# Patient Record
Sex: MALE | Race: WHITE | NOT HISPANIC OR LATINO | Employment: FULL TIME | URBAN - METROPOLITAN AREA
[De-identification: names, ages, dates, MRNs, and addresses within clinical notes are randomized per-mention and may not be internally consistent; named-entity substitution may affect disease eponyms.]

---

## 2017-10-17 PROBLEM — I10 HTN (HYPERTENSION), BENIGN: Status: ACTIVE | Noted: 2017-10-17

## 2017-10-17 PROBLEM — Z82.49 FAMILY HISTORY OF EARLY CAD: Status: ACTIVE | Noted: 2017-10-17

## 2017-10-17 PROBLEM — E66.812 CLASS 2 OBESITY DUE TO EXCESS CALORIES WITH BODY MASS INDEX (BMI) OF 35.0 TO 35.9 IN ADULT: Status: ACTIVE | Noted: 2017-10-17

## 2017-10-17 PROBLEM — E66.09 CLASS 2 OBESITY DUE TO EXCESS CALORIES WITH BODY MASS INDEX (BMI) OF 35.0 TO 35.9 IN ADULT: Status: ACTIVE | Noted: 2017-10-17

## 2017-10-17 NOTE — PROGRESS NOTES
Subjective:   Patient ID:  Mann Hernandez is a 56 y.o. male who presents for eval of CVD    HPI:  The patient is here for CAD risk factors/HTN.  The patient has no chest pain, SOB, TIA, palpitations, syncope or pre-syncope.Patient does not exercise a lot.BP typically 130/80-85.Several family with early MI or death.        Review of Systems   Constitution: Negative for chills, decreased appetite, diaphoresis, fever, weakness, malaise/fatigue, night sweats, weight gain and weight loss.   HENT: Negative for congestion, hoarse voice, nosebleeds, sore throat and tinnitus.    Eyes: Negative for blurred vision, double vision, vision loss in left eye, vision loss in right eye, visual disturbance and visual halos.   Cardiovascular: Negative for chest pain, claudication, cyanosis, dyspnea on exertion, irregular heartbeat, leg swelling, near-syncope, orthopnea, palpitations, paroxysmal nocturnal dyspnea and syncope.   Respiratory: Negative for cough, hemoptysis, shortness of breath, sleep disturbances due to breathing, snoring, sputum production and wheezing.    Endocrine: Negative for cold intolerance, heat intolerance, polydipsia, polyphagia and polyuria.   Hematologic/Lymphatic: Negative for adenopathy and bleeding problem. Does not bruise/bleed easily.   Skin: Negative for color change, dry skin, flushing, itching, nail changes, poor wound healing, rash, skin cancer, suspicious lesions and unusual hair distribution.   Musculoskeletal: Negative for arthritis, back pain, falls, gout, joint pain, joint swelling, muscle cramps, muscle weakness, myalgias and stiffness.   Gastrointestinal: Negative for abdominal pain, anorexia, change in bowel habit, constipation, diarrhea, dysphagia, heartburn, hematemesis, hematochezia, melena and vomiting.   Genitourinary: Negative for decreased libido, dysuria, hematuria, hesitancy and urgency.   Neurological: Negative for excessive daytime sleepiness, dizziness, focal weakness, headaches,  "light-headedness, loss of balance, numbness, paresthesias, seizures, sensory change, tremors and vertigo.   Psychiatric/Behavioral: Negative for altered mental status, depression, hallucinations, memory loss, substance abuse and suicidal ideas. The patient does not have insomnia and is not nervous/anxious.    Allergic/Immunologic: Negative for environmental allergies and hives.   ,,HDL 55, LDL 87    Objective: /86   Pulse 70   Ht 6' 1" (1.854 m)   Wt 127.3 kg (280 lb 10.3 oz)   BMI 37.03 kg/m²      Physical Exam   Constitutional: He is oriented to person, place, and time. He appears well-developed and well-nourished. No distress.   HENT:   Head: Normocephalic.   Eyes: EOM are normal. Pupils are equal, round, and reactive to light.   Neck: Normal range of motion. No thyromegaly present.   Cardiovascular: Normal rate, regular rhythm, normal heart sounds and intact distal pulses.  Exam reveals no gallop and no friction rub.    No murmur heard.  Pulses:       Carotid pulses are 3+ on the right side, and 3+ on the left side.       Radial pulses are 3+ on the right side, and 3+ on the left side.        Femoral pulses are 3+ on the right side, and 3+ on the left side.       Popliteal pulses are 3+ on the right side, and 3+ on the left side.        Dorsalis pedis pulses are 3+ on the right side, and 3+ on the left side.        Posterior tibial pulses are 3+ on the right side, and 3+ on the left side.   Pulmonary/Chest: Effort normal and breath sounds normal. No respiratory distress. He has no wheezes. He has no rales. He exhibits no tenderness.   Abdominal: Soft. He exhibits no distension and no mass. There is no tenderness.   Musculoskeletal: Normal range of motion.   Lymphadenopathy:     He has no cervical adenopathy.   Neurological: He is alert and oriented to person, place, and time.   Skin: Skin is warm. He is not diaphoretic. No cyanosis. Nails show no clubbing.   Psychiatric: He has a normal " mood and affect. His speech is normal and behavior is normal. Judgment and thought content normal. Cognition and memory are normal.       Assessment:     1. HTN (hypertension), benign    2. Family history of early CAD    3. Class 2 obesity due to excess calories with serious comorbidity and body mass index (BMI) of 35.0 to 35.9 in adult        Plan:   Discussed diet , achieving and maintaining ideal body weight, and exercise.   We reviewed meds in detail.  Reassured-discussed goals, options, plan.  Ramipril 5 mg at nite; discussed omega-3 -more important if CAC high;if CAC high, will get stress; discussed wt loss and exercise    Mann was seen today for family history of cad.    Diagnoses and all orders for this visit:    HTN (hypertension), benign  -     IN OFFICE EKG 12-LEAD (to Muse); Future; Expected date: 10/19/2017  -     Basic metabolic panel; Future; Expected date: 11/15/2017  -     CT Cardiac Scoring; Future; Expected date: 10/19/2017  -     CAR CT Cardiac Scoring; Future; Expected date: 10/19/2017  -     ramipril (ALTACE) 5 MG capsule; Take 1 capsule (5 mg total) by mouth once daily.    Family history of early CAD  -     IN OFFICE EKG 12-LEAD (to Muse); Future; Expected date: 10/19/2017  -     CT Cardiac Scoring; Future; Expected date: 10/19/2017  -     CAR CT Cardiac Scoring; Future; Expected date: 10/19/2017  -     ramipril (ALTACE) 5 MG capsule; Take 1 capsule (5 mg total) by mouth once daily.    Class 2 obesity due to excess calories with serious comorbidity and body mass index (BMI) of 35.0 to 35.9 in adult  -     IN OFFICE EKG 12-LEAD (to Muse); Future; Expected date: 10/19/2017  -     CT Cardiac Scoring; Future; Expected date: 10/19/2017  -     CAR CT Cardiac Scoring; Future; Expected date: 10/19/2017  -     ramipril (ALTACE) 5 MG capsule; Take 1 capsule (5 mg total) by mouth once daily.    Other orders  -     FLUARIX QUAD 5662-0256, PF, 60 mcg (15 mcg x 4)/0.5 mL vaccine; TO BE ADMINISTERED BY  PHARMACIST FOR IMMUNIZATION  -     amlodipine (NORVASC) 10 MG tablet; Take 10 mg by mouth once daily.  -     spironolactone (ALDACTONE) 25 MG tablet; Take 25 mg by mouth once daily.  -     loratadine (CLARITIN) 10 mg tablet; Take 10 mg by mouth daily as needed for Allergies.  -     aspirin (ECOTRIN) 81 MG EC tablet; Take 81 mg by mouth once daily.  -     fluticasone (FLONASE) 50 mcg/actuation nasal spray; 1 spray by Each Nare route once daily.            Return get CAC soon; chem 7 in 1 months and find out home BPs.FU prn

## 2017-10-18 ENCOUNTER — OFFICE VISIT (OUTPATIENT)
Dept: CARDIOLOGY | Facility: CLINIC | Age: 57
End: 2017-10-18
Payer: COMMERCIAL

## 2017-10-18 VITALS
HEIGHT: 73 IN | SYSTOLIC BLOOD PRESSURE: 134 MMHG | HEART RATE: 70 BPM | BODY MASS INDEX: 37.19 KG/M2 | DIASTOLIC BLOOD PRESSURE: 86 MMHG | WEIGHT: 280.63 LBS

## 2017-10-18 DIAGNOSIS — R69 DIAGNOSIS DEFERRED: ICD-10-CM

## 2017-10-18 DIAGNOSIS — I10 HTN (HYPERTENSION), BENIGN: ICD-10-CM

## 2017-10-18 DIAGNOSIS — Z82.49 FAMILY HISTORY OF EARLY CAD: ICD-10-CM

## 2017-10-18 PROCEDURE — 99204 OFFICE O/P NEW MOD 45 MIN: CPT | Mod: S$GLB,,, | Performed by: INTERNAL MEDICINE

## 2017-10-18 PROCEDURE — 99999 PR PBB SHADOW E&M-NEW PATIENT-LVL III: CPT | Mod: PBBFAC,,, | Performed by: INTERNAL MEDICINE

## 2017-10-18 PROCEDURE — 93000 ELECTROCARDIOGRAM COMPLETE: CPT | Mod: S$GLB,,, | Performed by: INTERNAL MEDICINE

## 2017-10-18 RX ORDER — ASPIRIN 81 MG/1
81 TABLET ORAL DAILY
COMMUNITY
End: 2019-04-16

## 2017-10-18 RX ORDER — FLUTICASONE PROPIONATE 50 MCG
1 SPRAY, SUSPENSION (ML) NASAL DAILY
COMMUNITY

## 2017-10-18 RX ORDER — RAMIPRIL 5 MG/1
5 CAPSULE ORAL DAILY
Qty: 90 CAPSULE | Refills: 3 | Status: SHIPPED | OUTPATIENT
Start: 2017-10-18 | End: 2019-04-16

## 2017-10-18 RX ORDER — AMLODIPINE BESYLATE 10 MG/1
10 TABLET ORAL DAILY
COMMUNITY

## 2017-10-18 RX ORDER — SPIRONOLACTONE 25 MG/1
25 TABLET ORAL DAILY
COMMUNITY

## 2017-10-18 RX ORDER — LORATADINE 10 MG/1
10 TABLET ORAL DAILY PRN
COMMUNITY

## 2017-10-18 NOTE — PATIENT INSTRUCTIONS
Discussed diet , achieving and maintaining ideal body weight, and exercise.   We reviewed meds in detail.  Reassured-discussed goals, options, plan.  Ramipril 5 mg at nite; discussed omega-3 -more important if CAC high;if CAC high, will get stress; discussed wt loss and exercise

## 2017-11-01 ENCOUNTER — HOSPITAL ENCOUNTER (OUTPATIENT)
Dept: RADIOLOGY | Facility: HOSPITAL | Age: 57
Discharge: HOME | End: 2017-11-01
Attending: INTERNAL MEDICINE
Payer: COMMERCIAL

## 2017-11-01 ENCOUNTER — TELEPHONE (OUTPATIENT)
Dept: CARDIOLOGY | Facility: CLINIC | Age: 57
End: 2017-11-01

## 2017-11-01 DIAGNOSIS — Z82.49 FAMILY HISTORY OF EARLY CAD: ICD-10-CM

## 2017-11-01 DIAGNOSIS — I10 HTN (HYPERTENSION), BENIGN: ICD-10-CM

## 2017-11-01 PROCEDURE — 75571 CT HRT W/O DYE W/CA TEST: CPT | Mod: TC

## 2017-11-01 PROCEDURE — 75571 CT HRT W/O DYE W/CA TEST: CPT | Mod: 26,,, | Performed by: RADIOLOGY

## 2017-11-01 NOTE — TELEPHONE ENCOUNTER
Results of calcium score were given to patient.    Notes Recorded by Jerzy Espinosa MD on 11/1/2017 at 3:22 PM CDT  Release-score only 5 so in the best 25% for his age and gender.

## 2017-12-05 ENCOUNTER — PATIENT MESSAGE (OUTPATIENT)
Dept: CARDIOLOGY | Facility: CLINIC | Age: 57
End: 2017-12-05

## 2019-04-16 ENCOUNTER — OFFICE VISIT (OUTPATIENT)
Dept: FAMILY MEDICINE | Facility: CLINIC | Age: 59
End: 2019-04-16
Payer: COMMERCIAL

## 2019-04-16 VITALS
SYSTOLIC BLOOD PRESSURE: 124 MMHG | WEIGHT: 286.63 LBS | DIASTOLIC BLOOD PRESSURE: 78 MMHG | HEART RATE: 80 BPM | BODY MASS INDEX: 38.82 KG/M2 | RESPIRATION RATE: 18 BRPM | TEMPERATURE: 98 F | OXYGEN SATURATION: 97 % | HEIGHT: 72 IN

## 2019-04-16 DIAGNOSIS — Z83.3 FAMILY HISTORY OF DIABETES MELLITUS: ICD-10-CM

## 2019-04-16 DIAGNOSIS — Z85.46 HISTORY OF PROSTATE CANCER: ICD-10-CM

## 2019-04-16 DIAGNOSIS — J30.2 SEASONAL ALLERGIC RHINITIS, UNSPECIFIED TRIGGER: ICD-10-CM

## 2019-04-16 DIAGNOSIS — L30.9 ECZEMA, UNSPECIFIED TYPE: ICD-10-CM

## 2019-04-16 DIAGNOSIS — Z00.00 ROUTINE MEDICAL EXAM: Primary | ICD-10-CM

## 2019-04-16 DIAGNOSIS — I10 ESSENTIAL HYPERTENSION: ICD-10-CM

## 2019-04-16 DIAGNOSIS — Z86.010 HISTORY OF COLONIC POLYPS: ICD-10-CM

## 2019-04-16 DIAGNOSIS — H61.23 BILATERAL IMPACTED CERUMEN: ICD-10-CM

## 2019-04-16 DIAGNOSIS — K21.9 GASTROESOPHAGEAL REFLUX DISEASE, ESOPHAGITIS PRESENCE NOT SPECIFIED: ICD-10-CM

## 2019-04-16 DIAGNOSIS — Z82.49 FAMILY HISTORY OF EARLY CAD: ICD-10-CM

## 2019-04-16 DIAGNOSIS — E79.0 HYPERURICEMIA: ICD-10-CM

## 2019-04-16 PROBLEM — Z86.0100 HISTORY OF COLONIC POLYPS: Status: ACTIVE | Noted: 2019-04-16

## 2019-04-16 PROCEDURE — 3078F PR MOST RECENT DIASTOLIC BLOOD PRESSURE < 80 MM HG: ICD-10-PCS | Mod: CPTII,S$GLB,, | Performed by: INTERNAL MEDICINE

## 2019-04-16 PROCEDURE — 99999 PR PBB SHADOW E&M-EST. PATIENT-LVL III: CPT | Mod: PBBFAC,,, | Performed by: INTERNAL MEDICINE

## 2019-04-16 PROCEDURE — 3078F DIAST BP <80 MM HG: CPT | Mod: CPTII,S$GLB,, | Performed by: INTERNAL MEDICINE

## 2019-04-16 PROCEDURE — 99386 PR PREVENTIVE VISIT,NEW,40-64: ICD-10-PCS | Mod: 25,S$GLB,, | Performed by: INTERNAL MEDICINE

## 2019-04-16 PROCEDURE — 3074F PR MOST RECENT SYSTOLIC BLOOD PRESSURE < 130 MM HG: ICD-10-PCS | Mod: CPTII,S$GLB,, | Performed by: INTERNAL MEDICINE

## 2019-04-16 PROCEDURE — 69210 REMOVE IMPACTED EAR WAX UNI: CPT | Mod: S$GLB,,, | Performed by: INTERNAL MEDICINE

## 2019-04-16 PROCEDURE — 99999 PR PBB SHADOW E&M-EST. PATIENT-LVL III: ICD-10-PCS | Mod: PBBFAC,,, | Performed by: INTERNAL MEDICINE

## 2019-04-16 PROCEDURE — 3074F SYST BP LT 130 MM HG: CPT | Mod: CPTII,S$GLB,, | Performed by: INTERNAL MEDICINE

## 2019-04-16 PROCEDURE — 99386 PREV VISIT NEW AGE 40-64: CPT | Mod: 25,S$GLB,, | Performed by: INTERNAL MEDICINE

## 2019-04-16 PROCEDURE — 69210 PR REMOVAL IMPACTED CERUMEN REQUIRING INSTRUMENTATION, UNILATERAL: ICD-10-PCS | Mod: S$GLB,,, | Performed by: INTERNAL MEDICINE

## 2019-04-16 RX ORDER — GUAIFENESIN 200 MG/1
400 TABLET ORAL EVERY 4 HOURS PRN
COMMUNITY

## 2019-04-16 RX ORDER — MINERAL OIL
180 ENEMA (ML) RECTAL DAILY
COMMUNITY

## 2019-04-16 RX ORDER — TRIAMCINOLONE ACETONIDE 1 MG/G
CREAM TOPICAL 2 TIMES DAILY
Qty: 45 G | Refills: 12 | Status: SHIPPED | OUTPATIENT
Start: 2019-04-16

## 2019-04-16 NOTE — PROGRESS NOTES
Chief complaint:  New patient physical    58-year-old white male new to primary care at [facility].  His prior PCP at [facility] retired.  He would follow up with Dr. Wahl in ENT about twice here to get wax removed.  He does feel is wax in the ears at this time and has been over a year since he had removal.  He does get a rash on his right ankle and calf and was told it was eczema and likely had some topical cortisone of some form and would like some prescribed.  He had labs about 1 month ago was told all was normal and he will get me a copy of those.  He has had a chronic cough which is better with Claritin and when allergies are worse he takes Allegra.  He is on reflux medications but no heartburn.  He had 1 episode of classic gout in 1 of his toes was on HCTZ at the time and has been off with no recurrence.  He did have some mild hyperuricemia.  He has a history of prostate cancer treated with radiation at [facility].  He has history of colon polyps and pretty sure it was 2017 was told to follow up in 3 years.      ROS:   CONST: weight stable. EYES: no vision change. ENT: no sore throat. CV: no chest pain w/ exertion. RESP: no shortness of breath. GI: no nausea, vomiting, diarrhea. No dysphagia. : no urinary issues. MUSCULOSKELETAL: no new myalgias or arthralgias. SKIN: no new changes. NEURO: no focal deficits. PSYCH: no new issues. ENDOCRINE: no polyuria. HEME: no lymph nodes. ALLERGY: no general pruritis.    Past Medical History:   Diagnosis Date    Eczema 4/16/2019    Elevated PSA/prostate cancer     Essential hypertension 4/16/2019    Family history of diabetes mellitus 4/16/2019    Family history of early CAD 10/17/2017    Gastroesophageal reflux disease, history of EGD at least x1 in the past 4/16/2019    History of colonic polyps- 2017 at [facility] -3 yrs 4/16/2019    History of prostate cancer- seeds at  4/16/2019    Hypertension     Hyperuricemia, acute gout only x1 in the toe 4/16/2019     Seasonal allergic rhinitis, uses Claritin and then Allegra when needed 2019    Ulcer      Past surgical history:  1.  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   for 30 years with no previous marriage and has no children.  He has never smoked.  He does drink alcohol daily a few beers or wine.    Family history:  Father  at 75 of a heart attack with on stated form of cancer.  Father had heart trouble and hypertension.  Mother with history of hypertension and diabetes.  One brother age 60 in good health.  No sisters.          Gen: no distress  EYES: conjunctiva clear, non-icteric, PERRL  ENT: nose clear, nasal mucosa normal, oropharynx clear and moist, teeth good, ear canals impacted with dark wax, after wax removed canals are normal and tympanic membranes normal  NECK:supple, thyroid non-palpable  RESP: effort is good, lungs clear  CV: heart RRR w/o murmur, gallops or rubs; no carotid bruits, no edema  GI: abdomen soft, non-distended, non-tender, no hepatosplenomegaly  MS: gait normal, no clubbing or cyanosis of the digits  SKIN: no rashes, warm to touch      Procedure note:  After verbal informed consent obtained physician used an ear pick to remove large chunks of dark ear wax from both ear canals without complications or patient discomfort.        Diagnoses and all orders for this visit:    Routine medical exam, new to the clinic, appears to be up-to-date on PSA and colonoscopy.  Colonoscopy may be due next year.  He will get a copy of his prior labs for review and make sure he is up-to-date on his PSA and so forth.  He obviously knows he needs to work on weight loss.    Essential hypertension, chronic and stable    Seasonal allergic rhinitis, unspecified trigger, chronic and stable at this point    Family history of early CAD    Hyperuricemia, follow for any gout exacerbations    Eczema, unspecified type, prescribe triamcinolone    History of  "colonic polyps, up-to-date    Gastroesophageal reflux disease, esophagitis presence not specified, continue PPI and discussed eventual checking a vitamin-D, magnesium in B12    History of prostate cancer    Family history of diabetes mellitus, assess for metabolic syndrome but he has not been told about any signs of metabolic syndrome in the past on his labs, I will review    Bilateral impacted cerumen, wax removed by physician    Other orders  -     triamcinolone acetonide 0.1% (KENALOG) 0.1 % cream; Apply topically 2 (two) times daily.                            Clinical no be sensitive since he is new to the clinic and I do not want any discrepancies in the available history to cause any problems for follow-up"This note will not be shared with the patient."       "

## 2019-04-17 ENCOUNTER — PATIENT MESSAGE (OUTPATIENT)
Dept: FAMILY MEDICINE | Facility: CLINIC | Age: 59
End: 2019-04-17

## 2019-04-17 DIAGNOSIS — Z12.11 COLON CANCER SCREENING: ICD-10-CM

## 2020-02-14 DIAGNOSIS — I10 ESSENTIAL HYPERTENSION: ICD-10-CM

## 2020-05-12 ENCOUNTER — PATIENT MESSAGE (OUTPATIENT)
Dept: ADMINISTRATIVE | Facility: HOSPITAL | Age: 60
End: 2020-05-12

## 2020-06-19 DIAGNOSIS — Z11.59 NEED FOR HEPATITIS C SCREENING TEST: ICD-10-CM

## 2021-03-02 ENCOUNTER — OFFICE VISIT (OUTPATIENT)
Dept: OTOLARYNGOLOGY | Facility: CLINIC | Age: 61
End: 2021-03-02
Payer: COMMERCIAL

## 2021-03-02 VITALS
TEMPERATURE: 98 F | BODY MASS INDEX: 38.57 KG/M2 | DIASTOLIC BLOOD PRESSURE: 90 MMHG | HEIGHT: 72 IN | SYSTOLIC BLOOD PRESSURE: 138 MMHG | WEIGHT: 284.75 LBS

## 2021-03-02 DIAGNOSIS — H61.23 BILATERAL IMPACTED CERUMEN: Primary | ICD-10-CM

## 2021-03-02 PROCEDURE — 99499 UNLISTED E&M SERVICE: CPT | Mod: S$GLB,,, | Performed by: NURSE PRACTITIONER

## 2021-03-02 PROCEDURE — 69210 EAR CERUMEN REMOVAL: ICD-10-PCS | Mod: S$GLB,,, | Performed by: NURSE PRACTITIONER

## 2021-03-02 PROCEDURE — 3008F PR BODY MASS INDEX (BMI) DOCUMENTED: ICD-10-PCS | Mod: CPTII,S$GLB,, | Performed by: NURSE PRACTITIONER

## 2021-03-02 PROCEDURE — 69210 REMOVE IMPACTED EAR WAX UNI: CPT | Mod: S$GLB,,, | Performed by: NURSE PRACTITIONER

## 2021-03-02 PROCEDURE — 1126F PR PAIN SEVERITY QUANTIFIED, NO PAIN PRESENT: ICD-10-PCS | Mod: S$GLB,,, | Performed by: NURSE PRACTITIONER

## 2021-03-02 PROCEDURE — 1126F AMNT PAIN NOTED NONE PRSNT: CPT | Mod: S$GLB,,, | Performed by: NURSE PRACTITIONER

## 2021-03-02 PROCEDURE — 3008F BODY MASS INDEX DOCD: CPT | Mod: CPTII,S$GLB,, | Performed by: NURSE PRACTITIONER

## 2021-03-02 PROCEDURE — 99499 NO LOS: ICD-10-PCS | Mod: S$GLB,,, | Performed by: NURSE PRACTITIONER

## 2021-04-12 ENCOUNTER — PATIENT MESSAGE (OUTPATIENT)
Dept: ADMINISTRATIVE | Facility: HOSPITAL | Age: 61
End: 2021-04-12

## 2021-04-26 ENCOUNTER — PATIENT MESSAGE (OUTPATIENT)
Dept: RESEARCH | Facility: HOSPITAL | Age: 61
End: 2021-04-26

## 2021-07-20 ENCOUNTER — PATIENT MESSAGE (OUTPATIENT)
Dept: OTOLARYNGOLOGY | Facility: CLINIC | Age: 61
End: 2021-07-20

## 2021-09-07 ENCOUNTER — TELEPHONE (OUTPATIENT)
Dept: OTOLARYNGOLOGY | Facility: CLINIC | Age: 61
End: 2021-09-07

## 2021-09-23 ENCOUNTER — OFFICE VISIT (OUTPATIENT)
Dept: OTOLARYNGOLOGY | Facility: CLINIC | Age: 61
End: 2021-09-23
Payer: COMMERCIAL

## 2021-09-23 VITALS
HEIGHT: 72 IN | SYSTOLIC BLOOD PRESSURE: 128 MMHG | BODY MASS INDEX: 38.61 KG/M2 | TEMPERATURE: 98 F | WEIGHT: 285.06 LBS | DIASTOLIC BLOOD PRESSURE: 76 MMHG

## 2021-09-23 DIAGNOSIS — H73.893 RETRACTED TYMPANIC MEMBRANE, BILATERAL: ICD-10-CM

## 2021-09-23 DIAGNOSIS — H74.12 ADHESIVE MIDDLE EAR DISEASE WITH ADHESIONS OF DRUM HEAD TO INCUS OF LEFT EAR: ICD-10-CM

## 2021-09-23 DIAGNOSIS — H73.892 RETRACTION POCKET OF TYMPANIC MEMBRANE OF LEFT EAR: ICD-10-CM

## 2021-09-23 DIAGNOSIS — H61.23 BILATERAL IMPACTED CERUMEN: Primary | ICD-10-CM

## 2021-09-23 PROCEDURE — 69210 EAR CERUMEN REMOVAL: ICD-10-PCS | Mod: S$GLB,,, | Performed by: OTOLARYNGOLOGY

## 2021-09-23 PROCEDURE — 3074F PR MOST RECENT SYSTOLIC BLOOD PRESSURE < 130 MM HG: ICD-10-PCS | Mod: CPTII,S$GLB,, | Performed by: OTOLARYNGOLOGY

## 2021-09-23 PROCEDURE — 99213 OFFICE O/P EST LOW 20 MIN: CPT | Mod: 25,S$GLB,, | Performed by: OTOLARYNGOLOGY

## 2021-09-23 PROCEDURE — 3008F BODY MASS INDEX DOCD: CPT | Mod: CPTII,S$GLB,, | Performed by: OTOLARYNGOLOGY

## 2021-09-23 PROCEDURE — 3078F DIAST BP <80 MM HG: CPT | Mod: CPTII,S$GLB,, | Performed by: OTOLARYNGOLOGY

## 2021-09-23 PROCEDURE — 3078F PR MOST RECENT DIASTOLIC BLOOD PRESSURE < 80 MM HG: ICD-10-PCS | Mod: CPTII,S$GLB,, | Performed by: OTOLARYNGOLOGY

## 2021-09-23 PROCEDURE — 3008F PR BODY MASS INDEX (BMI) DOCUMENTED: ICD-10-PCS | Mod: CPTII,S$GLB,, | Performed by: OTOLARYNGOLOGY

## 2021-09-23 PROCEDURE — 3074F SYST BP LT 130 MM HG: CPT | Mod: CPTII,S$GLB,, | Performed by: OTOLARYNGOLOGY

## 2021-09-23 PROCEDURE — 69210 REMOVE IMPACTED EAR WAX UNI: CPT | Mod: S$GLB,,, | Performed by: OTOLARYNGOLOGY

## 2021-09-23 PROCEDURE — 1160F RVW MEDS BY RX/DR IN RCRD: CPT | Mod: CPTII,S$GLB,, | Performed by: OTOLARYNGOLOGY

## 2021-09-23 PROCEDURE — 1159F PR MEDICATION LIST DOCUMENTED IN MEDICAL RECORD: ICD-10-PCS | Mod: CPTII,S$GLB,, | Performed by: OTOLARYNGOLOGY

## 2021-09-23 PROCEDURE — 1160F PR REVIEW ALL MEDS BY PRESCRIBER/CLIN PHARMACIST DOCUMENTED: ICD-10-PCS | Mod: CPTII,S$GLB,, | Performed by: OTOLARYNGOLOGY

## 2021-09-23 PROCEDURE — 1159F MED LIST DOCD IN RCRD: CPT | Mod: CPTII,S$GLB,, | Performed by: OTOLARYNGOLOGY

## 2021-09-23 PROCEDURE — 99213 PR OFFICE/OUTPT VISIT, EST, LEVL III, 20-29 MIN: ICD-10-PCS | Mod: 25,S$GLB,, | Performed by: OTOLARYNGOLOGY

## 2021-10-25 ENCOUNTER — PATIENT MESSAGE (OUTPATIENT)
Dept: OTOLARYNGOLOGY | Facility: CLINIC | Age: 61
End: 2021-10-25
Payer: COMMERCIAL

## 2021-11-03 ENCOUNTER — CLINICAL SUPPORT (OUTPATIENT)
Dept: AUDIOLOGY | Facility: CLINIC | Age: 61
End: 2021-11-03
Payer: COMMERCIAL

## 2021-11-03 DIAGNOSIS — H90.12 CONDUCTIVE HEARING LOSS OF LEFT EAR WITH UNRESTRICTED HEARING OF RIGHT EAR: Primary | ICD-10-CM

## 2021-11-03 PROCEDURE — 92550 PR TYMPANOMETRY AND REFLEX THRESHOLD MEASUREMENTS: ICD-10-PCS | Mod: S$GLB,,, | Performed by: AUDIOLOGIST

## 2021-11-03 PROCEDURE — 92550 TYMPANOMETRY & REFLEX THRESH: CPT | Mod: S$GLB,,, | Performed by: AUDIOLOGIST

## 2021-11-03 PROCEDURE — 92557 COMPREHENSIVE HEARING TEST: CPT | Mod: S$GLB,,, | Performed by: AUDIOLOGIST

## 2021-11-03 PROCEDURE — 92557 PR COMPREHENSIVE HEARING TEST: ICD-10-PCS | Mod: S$GLB,,, | Performed by: AUDIOLOGIST

## 2022-09-08 ENCOUNTER — OFFICE VISIT (OUTPATIENT)
Dept: OTOLARYNGOLOGY | Facility: CLINIC | Age: 62
End: 2022-09-08
Payer: COMMERCIAL

## 2022-09-08 VITALS — WEIGHT: 285 LBS | BODY MASS INDEX: 38.6 KG/M2 | HEIGHT: 72 IN

## 2022-09-08 DIAGNOSIS — H74.12: ICD-10-CM

## 2022-09-08 DIAGNOSIS — H73.892 RETRACTION POCKET OF TYMPANIC MEMBRANE OF LEFT EAR: Primary | ICD-10-CM

## 2022-09-08 DIAGNOSIS — H61.23 BILATERAL IMPACTED CERUMEN: ICD-10-CM

## 2022-09-08 PROCEDURE — 3008F PR BODY MASS INDEX (BMI) DOCUMENTED: ICD-10-PCS | Mod: CPTII,S$GLB,, | Performed by: OTOLARYNGOLOGY

## 2022-09-08 PROCEDURE — 69210 REMOVE IMPACTED EAR WAX UNI: CPT | Mod: S$GLB,,, | Performed by: OTOLARYNGOLOGY

## 2022-09-08 PROCEDURE — 99213 OFFICE O/P EST LOW 20 MIN: CPT | Mod: 25,S$GLB,, | Performed by: OTOLARYNGOLOGY

## 2022-09-08 PROCEDURE — 99213 PR OFFICE/OUTPT VISIT, EST, LEVL III, 20-29 MIN: ICD-10-PCS | Mod: 25,S$GLB,, | Performed by: OTOLARYNGOLOGY

## 2022-09-08 PROCEDURE — 3008F BODY MASS INDEX DOCD: CPT | Mod: CPTII,S$GLB,, | Performed by: OTOLARYNGOLOGY

## 2022-09-08 PROCEDURE — 69210 EAR CERUMEN REMOVAL: ICD-10-PCS | Mod: S$GLB,,, | Performed by: OTOLARYNGOLOGY

## 2022-09-08 PROCEDURE — 1159F PR MEDICATION LIST DOCUMENTED IN MEDICAL RECORD: ICD-10-PCS | Mod: CPTII,S$GLB,, | Performed by: OTOLARYNGOLOGY

## 2022-09-08 PROCEDURE — 1159F MED LIST DOCD IN RCRD: CPT | Mod: CPTII,S$GLB,, | Performed by: OTOLARYNGOLOGY

## 2022-09-09 NOTE — PROGRESS NOTES
Subjective:       Patient ID: Mann Hernandez is a 61 y.o. male.    Chief Complaint: Cerumen Impaction (Right > left, also would like to speak to you about the left ear from when he had that ear infection last year)    HPI     Mann Hernandez is a 61 y.o. male with history of left ear retraction pocket presents for follow up.  He reports continued left ear hearing loss and ear fullness.  It is mild.  He is here to recheck on this and also to have his ears cleaned.     Review of Systems   Constitutional:  Negative for chills and fever.   HENT:  Positive for hearing loss. Negative for sore throat and trouble swallowing.    Respiratory:  Negative for apnea and chest tightness.    Cardiovascular:  Negative for chest pain.       Objective:      Physical Exam  Vitals and nursing note reviewed.   Constitutional:       Appearance: Normal appearance.   HENT:      Head: Normocephalic and atraumatic.      Right Ear: Tympanic membrane, ear canal and external ear normal. There is impacted cerumen.      Left Ear: Ear canal and external ear normal. There is impacted cerumen. Tympanic membrane is retracted.      Ears:     Neurological:      Mental Status: He is alert.       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

## 2022-09-09 NOTE — PROCEDURES
Ear Cerumen Removal    Date/Time: 9/8/2022 10:45 AM  Performed by: Alejandro Choudhury MD  Authorized by: Alejandro Choudhury MD     Consent Done?:  Yes (Verbal)  Location details:  Both ears  Procedure type: curette    Cerumen  Removal Results:  Cerumen completely removed  Patient tolerance:  Patient tolerated the procedure well with no immediate complications

## 2023-09-21 ENCOUNTER — PATIENT MESSAGE (OUTPATIENT)
Dept: OTOLARYNGOLOGY | Facility: CLINIC | Age: 63
End: 2023-09-21
Payer: COMMERCIAL

## 2023-10-12 ENCOUNTER — CLINICAL SUPPORT (OUTPATIENT)
Dept: AUDIOLOGY | Facility: CLINIC | Age: 63
End: 2023-10-12
Payer: COMMERCIAL

## 2023-10-12 ENCOUNTER — OFFICE VISIT (OUTPATIENT)
Dept: OTOLARYNGOLOGY | Facility: CLINIC | Age: 63
End: 2023-10-12
Payer: COMMERCIAL

## 2023-10-12 DIAGNOSIS — H61.22 IMPACTED CERUMEN, LEFT EAR: Primary | ICD-10-CM

## 2023-10-12 DIAGNOSIS — H90.A21 SENSORINEURAL HEARING LOSS (SNHL) OF RIGHT EAR WITH RESTRICTED HEARING OF LEFT EAR: ICD-10-CM

## 2023-10-12 DIAGNOSIS — H73.892 RETRACTION POCKET OF TYMPANIC MEMBRANE OF LEFT EAR: ICD-10-CM

## 2023-10-12 DIAGNOSIS — H60.392 ACUTE INFECTIVE OTITIS EXTERNA, LEFT: ICD-10-CM

## 2023-10-12 DIAGNOSIS — H90.A12 CONDUCTIVE HEARING LOSS OF LEFT EAR WITH RESTRICTED HEARING OF RIGHT EAR: Primary | ICD-10-CM

## 2023-10-12 PROCEDURE — 92557 COMPREHENSIVE HEARING TEST: CPT | Mod: S$GLB,,,

## 2023-10-12 PROCEDURE — 92550 TYMPANOMETRY & REFLEX THRESH: CPT | Mod: S$GLB,,,

## 2023-10-12 PROCEDURE — 99214 OFFICE O/P EST MOD 30 MIN: CPT | Mod: 25,S$GLB,, | Performed by: OTOLARYNGOLOGY

## 2023-10-12 PROCEDURE — 1159F PR MEDICATION LIST DOCUMENTED IN MEDICAL RECORD: ICD-10-PCS | Mod: CPTII,S$GLB,, | Performed by: OTOLARYNGOLOGY

## 2023-10-12 PROCEDURE — 92550 PR TYMPANOMETRY AND REFLEX THRESHOLD MEASUREMENTS: ICD-10-PCS | Mod: S$GLB,,,

## 2023-10-12 PROCEDURE — 99214 PR OFFICE/OUTPT VISIT, EST, LEVL IV, 30-39 MIN: ICD-10-PCS | Mod: 25,S$GLB,, | Performed by: OTOLARYNGOLOGY

## 2023-10-12 PROCEDURE — 1159F MED LIST DOCD IN RCRD: CPT | Mod: CPTII,S$GLB,, | Performed by: OTOLARYNGOLOGY

## 2023-10-12 PROCEDURE — 69210 EAR CERUMEN REMOVAL: ICD-10-PCS | Mod: S$GLB,,, | Performed by: OTOLARYNGOLOGY

## 2023-10-12 PROCEDURE — 69210 REMOVE IMPACTED EAR WAX UNI: CPT | Mod: S$GLB,,, | Performed by: OTOLARYNGOLOGY

## 2023-10-12 PROCEDURE — 92557 PR COMPREHENSIVE HEARING TEST: ICD-10-PCS | Mod: S$GLB,,,

## 2023-10-12 RX ORDER — CIPROFLOXACIN AND DEXAMETHASONE 3; 1 MG/ML; MG/ML
4 SUSPENSION/ DROPS AURICULAR (OTIC) 2 TIMES DAILY
Qty: 7.5 ML | Refills: 0 | Status: SHIPPED | OUTPATIENT
Start: 2023-10-12 | End: 2023-10-22

## 2023-10-12 NOTE — PROCEDURES
Ear Cerumen Removal    Date/Time: 10/12/2023 8:15 AM    Performed by: Alejandro Choudhury MD  Authorized by: Alejandro Choudhury MD    Consent Done?:  Yes (Verbal)  Location details:  Left ear  Procedure type: curette    Cerumen  Removal Results:  Cerumen completely removed  Patient tolerance:  Patient tolerated the procedure well with no immediate complications

## 2023-10-12 NOTE — PROGRESS NOTES
Please click on link to view Audiogram:  Document on 10/12/2023  8:55 AM by Steph Luna AU.D: Audiogram       Mr. Mann Hernandez, a 62 y.o. male, was seen in the clinic today for an audiological evaluation. Mr. Hernandez's main complaint was bilateral hearing loss, worse for his left ear. Previous audiogram from 11/3/21 indicated essentially normal hearing with a mild high frequency sensorineural hearing loss (SNHL) at 8000 Hz for the right ear and a mild to moderate conductive hearing loss (CHL) for the left ear.    Otoscopy clear bilaterally. Tympanometry testing revealed a Type Ad tympanogram for the right ear and a Type A tympanogram for the left ear. Ipsilateral acoustic reflexes were present at all tested frequencies for the right ear and were present at 500-1000 Hz for the left ear.    Audiological testing revealed a mild high frequency SNHL at 1884-1793 Hz for the right ear and a mild sloping to moderately-severe CHL for the left ear. A speech reception threshold was obtained at 20 dBHL for the right ear and at 30 dBHL for the left ear. Speech discrimination was 100% for the right ear and 100% for the left ear.      Recommendations:  1. Otologic evaluation  2. Annual audiological evaluation or sooner if medically necessary  3. Hearing protection when in noise   4. Hearing aid consultation following medical clearance if Mr. Hernandez feels hearing loss negatively impacts quality of life

## 2023-10-12 NOTE — PROGRESS NOTES
Subjective     Patient ID: Mann Hernandez is a 62 y.o. male.    Chief Complaint: Cerumen Impaction (Both ears )    HPI       Presents today for complaint of left ear hearing loss x1 month.  He previously has had issues with cerumen buildup which she thinks may be causing the problem.  Denies any pain.  Denies any otorrhea.    Review of Systems   Constitutional:  Negative for chills and fever.   HENT:  Negative for sore throat and trouble swallowing.    Respiratory:  Negative for apnea and chest tightness.    Cardiovascular:  Negative for chest pain.          Objective     Physical Exam  Vitals and nursing note reviewed.   Constitutional:       Appearance: Normal appearance.   HENT:      Head: Normocephalic and atraumatic.      Right Ear: Tympanic membrane, ear canal and external ear normal. There is no impacted cerumen.      Left Ear: Ear canal and external ear normal. Drainage (Canal diffusely erythematous blocked by impaction.  There is some mucoid discharge consistent with otitis externa), swelling and tenderness present. There is impacted cerumen. Tympanic membrane is retracted.   Neurological:      Mental Status: He is alert.   Data reviewed:            Assessment and Plan     1. Impacted cerumen, left ear    2. Acute infective otitis externa, left    3. 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

## 2023-12-14 ENCOUNTER — CLINICAL SUPPORT (OUTPATIENT)
Dept: AUDIOLOGY | Facility: CLINIC | Age: 63
End: 2023-12-14
Payer: COMMERCIAL

## 2023-12-14 ENCOUNTER — OFFICE VISIT (OUTPATIENT)
Dept: OTOLARYNGOLOGY | Facility: CLINIC | Age: 63
End: 2023-12-14
Payer: COMMERCIAL

## 2023-12-14 VITALS — HEIGHT: 72 IN | BODY MASS INDEX: 38.6 KG/M2 | WEIGHT: 285 LBS

## 2023-12-14 DIAGNOSIS — H73.892 RETRACTION POCKET OF TYMPANIC MEMBRANE OF LEFT EAR: ICD-10-CM

## 2023-12-14 DIAGNOSIS — H90.42 SENSORINEURAL HEARING LOSS (SNHL) OF LEFT EAR WITH UNRESTRICTED HEARING OF RIGHT EAR: Primary | ICD-10-CM

## 2023-12-14 DIAGNOSIS — H90.A22 SENSORINEURAL HEARING LOSS (SNHL) OF LEFT EAR WITH RESTRICTED HEARING OF RIGHT EAR: Primary | ICD-10-CM

## 2023-12-14 DIAGNOSIS — H74.12: ICD-10-CM

## 2023-12-14 PROCEDURE — 92550 PR TYMPANOMETRY AND REFLEX THRESHOLD MEASUREMENTS: ICD-10-PCS | Mod: S$GLB,,,

## 2023-12-14 PROCEDURE — 3008F PR BODY MASS INDEX (BMI) DOCUMENTED: ICD-10-PCS | Mod: CPTII,S$GLB,, | Performed by: OTOLARYNGOLOGY

## 2023-12-14 PROCEDURE — 1159F MED LIST DOCD IN RCRD: CPT | Mod: CPTII,S$GLB,, | Performed by: OTOLARYNGOLOGY

## 2023-12-14 PROCEDURE — 1159F PR MEDICATION LIST DOCUMENTED IN MEDICAL RECORD: ICD-10-PCS | Mod: CPTII,S$GLB,, | Performed by: OTOLARYNGOLOGY

## 2023-12-14 PROCEDURE — 92557 PR COMPREHENSIVE HEARING TEST: ICD-10-PCS | Mod: S$GLB,,,

## 2023-12-14 PROCEDURE — 92550 TYMPANOMETRY & REFLEX THRESH: CPT | Mod: S$GLB,,,

## 2023-12-14 PROCEDURE — 3008F BODY MASS INDEX DOCD: CPT | Mod: CPTII,S$GLB,, | Performed by: OTOLARYNGOLOGY

## 2023-12-14 PROCEDURE — 92557 COMPREHENSIVE HEARING TEST: CPT | Mod: S$GLB,,,

## 2023-12-14 PROCEDURE — 99213 OFFICE O/P EST LOW 20 MIN: CPT | Mod: S$GLB,,, | Performed by: OTOLARYNGOLOGY

## 2023-12-14 PROCEDURE — 99213 PR OFFICE/OUTPT VISIT, EST, LEVL III, 20-29 MIN: ICD-10-PCS | Mod: S$GLB,,, | Performed by: OTOLARYNGOLOGY

## 2023-12-14 RX ORDER — DIAZEPAM 5 MG/1
5 TABLET ORAL ONCE
Qty: 1 TABLET | Refills: 0 | Status: SHIPPED | OUTPATIENT
Start: 2023-12-14 | End: 2023-12-14

## 2023-12-14 NOTE — PROGRESS NOTES
Subjective     Patient ID: Mann Hernandez is a 63 y.o. male.    Chief Complaint: Follow-up (2 month follow up for hearing loss with hearing test)    Follow-up  Pertinent negatives include no chest pain, chills, fever or sore throat.          Presents today for complaint of left ear hearing loss x1 month.  He previously has had issues with cerumen buildup which she thinks may be causing the problem.  Denies any pain.  Denies any otorrhea.      12/14/23:  Here to follow up today reports that ear feels significantly better but not quite as good as right ear    Review of Systems   Constitutional:  Negative for chills and fever.   HENT:  Negative for sore throat and trouble swallowing.    Respiratory:  Negative for apnea and chest tightness.    Cardiovascular:  Negative for chest pain.          Objective     Physical Exam  Vitals and nursing note reviewed.   Constitutional:       Appearance: Normal appearance.   HENT:      Head: Normocephalic and atraumatic.      Right Ear: Tympanic membrane, ear canal and external ear normal. There is no impacted cerumen.      Left Ear: Ear canal and external ear normal. No drainage, swelling or tenderness. There is no impacted cerumen. Tympanic membrane is retracted.   Neurological:      Mental Status: He is alert.     Data reviewed:                    Assessment and Plan     1. Sensorineural hearing loss (SNHL) of left ear with unrestricted hearing of right ear  -     MRI IAC/Temporal Bones W W/O Contrast; Future; Expected date: 12/14/2023    2. 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 for 1 dose  Dispense: 1 tablet; Refill: 0        Infection resolved, however patient still has asymmetric mixed hearing loss of left ear  Recommend MRI to rule out CPA tumor   OtherwiseFollow up yearly for repeat audiograms.    No follow-ups on  file.

## 2023-12-14 NOTE — PROGRESS NOTES
Please click on link to view Audiogram:  Document on 12/14/2023 9:16 AM by Steph Luna AU.D: Audiogram    Mr. Mann Hernandez, a 63 y.o. male, was seen in the clinic today for a follow-up audiological evaluation. Previous audiogram from 10/12/23 indicated a mild high frequency sensorineural hearing loss (SNHL) at 6097-4404 Hz for the right ear and a mild sloping to moderately-severe conductive hearing loss (CHL) for the left ear.     Otoscopy clear bilaterally. Tympanometry testing revealed a Type Ad tympanogram for the right ear and a Type C tympanogram for the left ear. Ipsilateral acoustic reflexes were present at all tested frequencies for the right ear and were present at 1000 Hz and 2000 Hz for the left ear.    Audiological testing revealed a mild high frequency SNHL at 5727-2555 Hz for the right ear and a mild to moderate SNHL with a conductive component at 4000 Hz for the left ear. A speech reception threshold was obtained at 15 dBHL for the right ear and at 25 dBHL for the left ear. Speech discrimination was 100% for the right ear and 100% for the left ear.      Recommendations:  1. Otologic evaluation  2. Annual audiological evaluation or sooner if hearing changes  3. Hearing protection when in noise   4. Hearing aid consultation following medical clearance if Mr. Hernandez feels hearing loss negatively impacts quality of life

## 2024-01-04 ENCOUNTER — LAB VISIT (OUTPATIENT)
Dept: LAB | Facility: HOSPITAL | Age: 64
End: 2024-01-04
Attending: OTOLARYNGOLOGY
Payer: COMMERCIAL

## 2024-01-04 DIAGNOSIS — H90.42 SENSORINEURAL HEARING LOSS (SNHL) OF LEFT EAR WITH UNRESTRICTED HEARING OF RIGHT EAR: ICD-10-CM

## 2024-01-04 LAB
CREAT SERPL-MCNC: 1.4 MG/DL (ref 0.5–1.4)
EST. GFR  (NO RACE VARIABLE): 56 ML/MIN/1.73 M^2

## 2024-01-04 PROCEDURE — 82565 ASSAY OF CREATININE: CPT | Performed by: OTOLARYNGOLOGY

## 2024-01-04 PROCEDURE — 36415 COLL VENOUS BLD VENIPUNCTURE: CPT | Performed by: OTOLARYNGOLOGY

## 2024-01-06 ENCOUNTER — HOSPITAL ENCOUNTER (OUTPATIENT)
Dept: RADIOLOGY | Facility: HOSPITAL | Age: 64
Discharge: HOME | End: 2024-01-06
Attending: OTOLARYNGOLOGY
Payer: COMMERCIAL

## 2024-01-06 DIAGNOSIS — H90.42 SENSORINEURAL HEARING LOSS (SNHL) OF LEFT EAR WITH UNRESTRICTED HEARING OF RIGHT EAR: ICD-10-CM

## 2024-01-06 PROCEDURE — A9585 GADOBUTROL INJECTION: HCPCS | Performed by: OTOLARYNGOLOGY

## 2024-01-06 PROCEDURE — 70553 MRI BRAIN STEM W/O & W/DYE: CPT | Mod: TC

## 2024-01-06 PROCEDURE — 25500020 PHARM REV CODE 255: Performed by: OTOLARYNGOLOGY

## 2024-01-06 PROCEDURE — 70553 MRI BRAIN STEM W/O & W/DYE: CPT | Mod: 26,,, | Performed by: RADIOLOGY

## 2024-01-06 RX ORDER — GADOBUTROL 604.72 MG/ML
10 INJECTION INTRAVENOUS
Status: COMPLETED | OUTPATIENT
Start: 2024-01-06 | End: 2024-01-06

## 2024-01-06 RX ADMIN — GADOBUTROL 10 ML: 604.72 INJECTION INTRAVENOUS at 02:01

## 2024-10-16 ENCOUNTER — TELEPHONE (OUTPATIENT)
Dept: OTOLARYNGOLOGY | Facility: CLINIC | Age: 64
End: 2024-10-16
Payer: COMMERCIAL

## 2024-11-14 ENCOUNTER — OFFICE VISIT (OUTPATIENT)
Dept: OTOLARYNGOLOGY | Facility: CLINIC | Age: 64
End: 2024-11-14
Payer: COMMERCIAL

## 2024-11-14 VITALS — DIASTOLIC BLOOD PRESSURE: 84 MMHG | SYSTOLIC BLOOD PRESSURE: 133 MMHG | HEART RATE: 77 BPM

## 2024-11-14 DIAGNOSIS — H61.23 IMPACTED CERUMEN, BILATERAL: Primary | ICD-10-CM

## 2024-11-14 PROCEDURE — 99499 UNLISTED E&M SERVICE: CPT | Mod: S$GLB,,, | Performed by: OTOLARYNGOLOGY

## 2024-11-14 PROCEDURE — 69210 REMOVE IMPACTED EAR WAX UNI: CPT | Mod: S$GLB,,, | Performed by: OTOLARYNGOLOGY

## 2024-11-14 NOTE — PROCEDURES
Ear Cerumen Removal    Date/Time: 11/14/2024 10:15 AM    Performed by: Alejandro Choudhury MD  Authorized by: Alejandro Choudhury MD    Consent Done?:  Yes (Verbal)  Location details:  Both ears  Procedure type: curette    Cerumen  Removal Results:  Cerumen completely removed  Patient tolerance:  Patient tolerated the procedure well with no immediate complications

## 2025-06-25 ENCOUNTER — OFFICE VISIT (OUTPATIENT)
Dept: FAMILY MEDICINE | Facility: CLINIC | Age: 65
End: 2025-06-25
Payer: COMMERCIAL

## 2025-06-25 VITALS
OXYGEN SATURATION: 97 % | WEIGHT: 274.94 LBS | SYSTOLIC BLOOD PRESSURE: 112 MMHG | BODY MASS INDEX: 37.24 KG/M2 | DIASTOLIC BLOOD PRESSURE: 72 MMHG | TEMPERATURE: 98 F | HEART RATE: 104 BPM | HEIGHT: 72 IN

## 2025-06-25 DIAGNOSIS — R35.0 BENIGN PROSTATIC HYPERPLASIA WITH URINARY FREQUENCY: ICD-10-CM

## 2025-06-25 DIAGNOSIS — M79.674 TOE PAIN, RIGHT: ICD-10-CM

## 2025-06-25 DIAGNOSIS — K21.9 GASTROESOPHAGEAL REFLUX DISEASE, UNSPECIFIED WHETHER ESOPHAGITIS PRESENT: ICD-10-CM

## 2025-06-25 DIAGNOSIS — Z85.46 HISTORY OF PROSTATE CANCER: ICD-10-CM

## 2025-06-25 DIAGNOSIS — Z82.49 FAMILY HISTORY OF EARLY CAD: ICD-10-CM

## 2025-06-25 DIAGNOSIS — Z86.0100 HISTORY OF COLONIC POLYPS: ICD-10-CM

## 2025-06-25 DIAGNOSIS — F39 MOOD DISORDER: ICD-10-CM

## 2025-06-25 DIAGNOSIS — E79.0 HYPERURICEMIA: ICD-10-CM

## 2025-06-25 DIAGNOSIS — Z12.5 SCREENING FOR PROSTATE CANCER: ICD-10-CM

## 2025-06-25 DIAGNOSIS — Z00.00 ROUTINE MEDICAL EXAM: Primary | ICD-10-CM

## 2025-06-25 DIAGNOSIS — F40.240 CLAUSTROPHOBIA: ICD-10-CM

## 2025-06-25 DIAGNOSIS — I10 ESSENTIAL HYPERTENSION: ICD-10-CM

## 2025-06-25 DIAGNOSIS — N40.1 BENIGN PROSTATIC HYPERPLASIA WITH URINARY FREQUENCY: ICD-10-CM

## 2025-06-25 DIAGNOSIS — Z83.3 FAMILY HISTORY OF DIABETES MELLITUS: ICD-10-CM

## 2025-06-25 DIAGNOSIS — R10.11 RUQ PAIN: ICD-10-CM

## 2025-06-25 DIAGNOSIS — J30.2 SEASONAL ALLERGIC RHINITIS, UNSPECIFIED TRIGGER: ICD-10-CM

## 2025-06-25 PROCEDURE — 3078F DIAST BP <80 MM HG: CPT | Mod: CPTII,S$GLB,, | Performed by: INTERNAL MEDICINE

## 2025-06-25 PROCEDURE — 99999 PR PBB SHADOW E&M-EST. PATIENT-LVL III: CPT | Mod: PBBFAC,,, | Performed by: INTERNAL MEDICINE

## 2025-06-25 PROCEDURE — 3008F BODY MASS INDEX DOCD: CPT | Mod: CPTII,S$GLB,, | Performed by: INTERNAL MEDICINE

## 2025-06-25 PROCEDURE — 99386 PREV VISIT NEW AGE 40-64: CPT | Mod: S$GLB,,, | Performed by: INTERNAL MEDICINE

## 2025-06-25 PROCEDURE — 3074F SYST BP LT 130 MM HG: CPT | Mod: CPTII,S$GLB,, | Performed by: INTERNAL MEDICINE

## 2025-06-25 RX ORDER — BUPROPION HYDROCHLORIDE 300 MG/1
300 TABLET ORAL EVERY MORNING
COMMUNITY

## 2025-06-25 RX ORDER — DIAZEPAM 5 MG/1
5 TABLET ORAL EVERY 8 HOURS PRN
Qty: 30 TABLET | Refills: 1 | Status: SHIPPED | OUTPATIENT
Start: 2025-06-25

## 2025-06-25 RX ORDER — DOXAZOSIN 4 MG/1
4 TABLET ORAL
COMMUNITY

## 2025-06-25 RX ORDER — TAMSULOSIN HYDROCHLORIDE 0.4 MG/1
1 CAPSULE ORAL
COMMUNITY

## 2025-06-25 NOTE — PROGRESS NOTES
Chief complaint:  New patient physical, last seen me 2019 for new pt appt then, abdominal pain, pain in right big toe    58-year-old white male new to primary care at [facility].  His prior PCP at [facility] retired.  Looks like he continues with the an outside PCP in when I saw him years ago it was for the purpose of cleaning out his ears.  Looks like in the interval he has had [facility] clean out his ear several times.  He does note decreased hearing in his sensation of cerumen impaction that he has had before.  He would follow up with Dr. Wahl in ENT about twice here to get wax removed. Then [facility], last 11/24.  He does feel is wax in the ears at this time and has been about six-months since he had removal.  ears inspected today and they are clear of wax    PCP retired.  He is establishing care here now.      Regarding health maintenance he is due for labs and presumably PSA     History of colonic polyps- 2017 at [facility] -3 yrs, 8/2020 [facility] -5 yrs.  Will put in a referral back to [facility].  He saw Dr. Bolanos    After scheduling the appointment he reports that at about 9:00 p.m. on Saturday while sitting watching TV and this was about four days ago he developed some acute abdominal pain in the flanks and then it went to the right upper quadrant then more above the umbilicus.  He felt bloated.  He felt like he had to get up and move around.  It lasted for 2-3 hours.  This was longer than previous and he has had about two or three episodes over the past couple of months similar but it was very brief.  It does not appear to relate to eating or not eating and there was no associated nausea vomiting diarrhea or constipation associated with the it.  His bowels are moving normal.  He does have a little bit of pain in the right upper quadrant when I 1st push but then when I push slowly deeper to the right upper quadrant he is nontender Gamboa sign is negative.  I do not think there is an indication to do a CT at this time  based on the benign exam I do not think we would see any diverticulitis but that was considered.  Based on the location gallbladder pathology obviously needs to be considered and so will at least get right upper quadrant ultrasound.  ER precautions discussed.  The night that he had it he was a little bit more sweaty like he had a fever but did not check.  No subsequent subjective type fevers or sweats.    Also was out taking out the trash six days ago and wearing some flip-flops.  Without any known injury thereafter he has had some pain and redness in the based joint of the right big toe.  He can walk on it without problems.  Is little bit red and I can wiggle it up and down and resisted flexion and extension of the tendons is nontender.  Does not appear to have a bunion formation.  There is no breaks in the skin or signs of cellulitis.  Discussed he may have had an unrecognized injury or partial dislocation and may have disrupted the joint and gotten the joint inflame.  Will have him monitor it clinically.    Another issue is claustrophobia looks like he was given a prescription of Valium 5 mg to take as needed he was given 30 and still has a lot left but there were no refills.  I will give him a refill in his you sounds like it has been minimal.    Outside records reviewed.  Not able to review his labs if he has had them     Interval seeing   DAFNE Bradford     Assessment:       1. Essential hypertension - I10 (Primary)   2. Gastroesophageal reflux disease without esophagitis - K21.9   3. H/O prostate cancer - Z85.46   4. Current mild episode of major depressive disorder without prior episode - F32.0   5. Polyuria - R35.89   6. Onychomycosis - B35.1   7. Seasonal allergic rhinitis due to pollen - J30.1   8. BMI 38.0-38.9,adult - Z68.38   9. Situational anxiety - F41.8          Plan:   -  Treatment:   -  1. Essential hypertension   Refill Doxazosin Mesylate Tablet, 4 MG, 1 tablet, Orally, Once a day, 90  days, 90, Refills 1; Refill Spironolactone Tablet, 25 MG, 1 tablet, Orally, once a day, 90 days, 90 Tablet, Refills 1.    Clinical Notes: At goal with meds.         2. Gastroesophageal reflux disease without esophagitis   Continue Omeprazole Capsule Delayed Release, 20 MG, 1 capsule, Orally, Once a day.    Clinical Notes: Controlled with meds.         3. H/O prostate cancer   Clinical Notes: Following syed DE JESUS.         4. Current mild episode of major depressive disorder without prior episode   Refill Wellbutrin XL Tablet Extended Release 24 Hour, 300 MG, 1 tablet in the morning, Orally, Once a day, 90 days, 90 Tablet, Refills 1.    Clinical Notes: Controlled with meds.         5. Polyuria   Start Tamsulosin HCl Capsule, 0.4 MG, 1 capsule for bladder, Orally, Once a day, 90 days, 90 Capsule, Refills 1.    Clinical Notes: Will give trial of Flomax.         6. Onychomycosis   Continue Fluconazole Tablet, 200 MG, TAKE 1 TABLET BY MOUTH ONCE A WEEK IN THE MORNING WITH FOOD, Oral.    Clinical Notes: Following with Derm.         7. Seasonal allergic rhinitis due to pollen   Continue Fluticasone Propionate Suspension, 50 MCG/ACT, 1 spray in each nostril, Nasally, Once a day; Continue Loratadine Tablet, 10 MG, 1 tablet, Orally, Once a day.    Clinical Notes: Controlled with meds.         8. Situational anxiety   Start diazePAM Tablet, 5 MG, 1 tablet as needed for anxiety, Orally, Once a day, 30 days, 30 Tablet.    Clinical Notes: Pt requests a script for clostriphobia. Advised to use this very sparingly.                 He has had a chronic cough which is better with Claritin and when allergies are worse he takes Allegra.      He is on reflux medications but no heartburn.      He had 1 episode of classic gout in 1 of his toes was on HCTZ at the time and has been off with no recurrence.  He did have some mild hyperuricemia.      He has a history of prostate cancer treated with radiation at [facility]. Sees Labadie     He  has history of colon polyps and pretty sure it was 2017 was told to follow up in 3 years.      ROS:   CONST: weight stable. EYES: no vision change. ENT: no sore throat. CV: no chest pain w/ exertion. RESP: no shortness of breath. GI: no nausea, vomiting, diarrhea. No dysphagia. : no urinary issues. MUSCULOSKELETAL: no new myalgias or arthralgias. SKIN: no new changes. NEURO: no focal deficits. PSYCH: no new issues. ENDOCRINE: no polyuria. HEME: no lymph nodes. ALLERGY: no general pruritis.    Past Medical History:   Diagnosis Date    Eczema 2019    Elevated PSA/prostate cancer     Essential hypertension 2019    Family history of diabetes mellitus 2019    Family history of early CAD 10/17/2017    Gastroesophageal reflux disease, history of EGD at least x1 in the past 2019    History of colonic polyps- 2017 at [facility] -3 yrs, 2020 [facility] -5 yrs 2019    History of prostate cancer- seeds at  2019    Hypertension     Hyperuricemia, acute gout only x1 in the toe 2019    Seasonal allergic rhinitis, uses Claritin and then Allegra when needed 2019    Ulcer    BPH  Depression- wellbutrin    Past surgical history:  1.  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   for 30 years with no previous marriage and has no children.  He has never smoked.  He does drink alcohol daily a few beers or wine.    Family history:  Father  at 75 of a heart attack with on stated form of cancer.  Father had heart trouble and hypertension.  Mother with history of hypertension and diabetes.  One brother age 60 in good health.  No sisters.          Gen: no distress  EYES: conjunctiva clear, non-icteric, PERRL  ENT: nose clear, nasal mucosa normal, oropharynx clear and moist, teeth good, ear canals clear  NECK:supple, thyroid non-palpable  RESP: effort is good, lungs clear  CV: heart RRR w/o murmur, gallops or rubs; no carotid bruits, no  edema  GI: abdomen soft, non-distended, slightly tender when 1st pressed in the right upper quadrant but then I can slowly press deeply without tenderness and Gamboa sign is negative., no hepatosplenomegaly  MS: gait normal, no clubbing or cyanosis of the digits, redness to the left big toe joint base slightly tender on movement.  SKIN: no rashes, warm to touch          NEW PATIENT to [facility]    Diagnoses and all orders for this visit:    Diagnoses and all orders for this visit:    Routine medical exam, new to the clinic, due for PSA and colon cancer screening with history of polyps.  He wants to wait until after November his birthday when he will have Medicare and you can discuss that with Chato LIU.  He believes he needs to go to [facility] so all of his labs were reordered as below going to [facility].  -     diazePAM (VALIUM) 5 MG tablet; Take 1 tablet (5 mg total) by mouth every 8 (eight) hours as needed for Anxiety.  -     Cancel: Prostate Specific Antigen, Diagnostic; Future  -     Cancel: TSH; Future  -     Cancel: CBC Auto Differential; Future  -     Cancel: Comprehensive Metabolic Panel; Future  -     Cancel: Lipid Panel; Future  -     Cancel: Hemoglobin A1C; Future  -     Hemoglobin A1C; Future  -     Lipid Panel; Future  -     Comprehensive Metabolic Panel; Future  -     CBC Auto Differential; Future  -     TSH; Future  -     Hemoglobin A1C  -     Lipid Panel  -     Comprehensive Metabolic Panel  -     CBC Auto Differential  -     TSH    Essential hypertension, check associated labs, chronic and stable  -     Cancel: TSH; Future  -     Cancel: CBC Auto Differential; Future  -     Cancel: Comprehensive Metabolic Panel; Future  -     Cancel: Lipid Panel; Future  -     Cancel: Hemoglobin A1C; Future  -     Hemoglobin A1C; Future  -     Lipid Panel; Future  -     Comprehensive Metabolic Panel; Future  -     CBC Auto Differential; Future  -     TSH; Future  -     Hemoglobin A1C  -     Lipid Panel  -      Comprehensive Metabolic Panel  -     CBC Auto Differential  -     TSH    Gastroesophageal reflux disease, unspecified whether esophagitis present, chronic and stable on PPI    History of colonic polyps, wants to wait until he has Medicare in November  -     Ambulatory referral/consult to Gastroenterology; Future    Hyperuricemia, recheck uric acid level    History of prostate cancer  -     Cancel: Prostate Specific Antigen, Diagnostic; Future    Family history of diabetes mellitus, check A1c    Family history of early CAD, check lipids    Seasonal allergic rhinitis, unspecified trigger, chronic and stable    Mood disorder, chronic and stable on Wellbutrin    Benign prostatic hyperplasia with urinary frequency  -     Cancel: Prostate Specific Antigen, Diagnostic; Future    Claustrophobia  -     diazePAM (VALIUM) 5 MG tablet; Take 1 tablet (5 mg total) by mouth every 8 (eight) hours as needed for Anxiety.    RUQ pain, some episodes of abdominal pain that may well eventually have some right upper quadrant involvement.  Start with right upper quadrant ultrasound.  If it recurs consider diverticulitis and at that point he might need CT and ER precautions discuss that should it recur at any greater severity might need to go to the ER for a CT scan.  By description does not appear to be anything vascular based on location and movement and so forth.  He does have a noted history of some hyperuricemia so perhaps a so with some mild gout although the pain severity would be not consistent with gout being mild.  -     US Abdomen Limited; Future    Toe pain, right, does not appear to be gout or cellulitis, likely an unrecognized injury, he is on a PPI with no renal insufficiency history so he can take a few days of anti-inflammatories if needed    Screening for prostate cancer  -     PSA, Total and Free; Future  -     PSA, Total and Free          This is a patient with chronic and complex diagnoses whose overall, ongoing care is  being managed and monitored by me and our primary care clinic. As such,   is the appropriate add-on code to accompany the other E/M billing for this visit.

## 2025-06-26 ENCOUNTER — PATIENT MESSAGE (OUTPATIENT)
Dept: FAMILY MEDICINE | Facility: CLINIC | Age: 65
End: 2025-06-26
Payer: COMMERCIAL

## 2025-06-26 ENCOUNTER — TELEPHONE (OUTPATIENT)
Dept: FAMILY MEDICINE | Facility: CLINIC | Age: 65
End: 2025-06-26
Payer: COMMERCIAL

## 2025-06-26 NOTE — TELEPHONE ENCOUNTER
Copied from CRM #[id number]. Topic: General Inquiry - Patient Advice  >> Jun 26, 2025  2:42 PM Kathy wrote:  .Type:  Patient Returning Call    Who Called: Self     Who Left Message for Patient: Alanna    Does the patient know what this is regarding?: yes     Would the patient rather a call back or a response via [facility]? Call     Best Call Back Number: [phone]      Additional Information:

## 2025-06-27 ENCOUNTER — HOSPITAL ENCOUNTER (OUTPATIENT)
Dept: RADIOLOGY | Facility: HOSPITAL | Age: 65
Discharge: HOME | End: 2025-06-27
Attending: INTERNAL MEDICINE
Payer: COMMERCIAL

## 2025-06-27 DIAGNOSIS — R10.11 RUQ PAIN: ICD-10-CM

## 2025-06-27 PROCEDURE — 76705 ECHO EXAM OF ABDOMEN: CPT | Mod: TC

## 2025-06-27 PROCEDURE — 76705 ECHO EXAM OF ABDOMEN: CPT | Mod: 26,,, | Performed by: RADIOLOGY

## 2025-06-28 LAB
ALBUMIN SERPL-MCNC: 4.1 G/DL (ref 3.6–5.1)
ALBUMIN/GLOB SERPL: 1.5 (CALC) (ref 1–2.5)
ALP SERPL-CCNC: 104 U/L (ref 35–144)
ALT SERPL-CCNC: 41 U/L (ref 9–46)
AST SERPL-CCNC: 31 U/L (ref 10–35)
BASOPHILS # BLD AUTO: 28 CELLS/UL (ref 0–200)
BASOPHILS NFR BLD AUTO: 0.4 %
BILIRUB SERPL-MCNC: 0.6 MG/DL (ref 0.2–1.2)
BUN SERPL-MCNC: 14 MG/DL (ref 7–25)
BUN/CREAT SERPL: NORMAL (CALC) (ref 6–22)
CALCIUM SERPL-MCNC: 9.3 MG/DL (ref 8.6–10.3)
CHLORIDE SERPL-SCNC: 102 MMOL/L (ref 98–110)
CHOLEST SERPL-MCNC: 143 MG/DL
CHOLEST/HDLC SERPL: 3.1 (CALC)
CO2 SERPL-SCNC: 26 MMOL/L (ref 20–32)
CREAT SERPL-MCNC: 1.2 MG/DL (ref 0.7–1.35)
EGFR: 68 ML/MIN/1.73M2
EOSINOPHIL # BLD AUTO: 301 CELLS/UL (ref 15–500)
EOSINOPHIL NFR BLD AUTO: 4.3 %
ERYTHROCYTE [DISTWIDTH] IN BLOOD BY AUTOMATED COUNT: 12.5 % (ref 11–15)
GLOBULIN SER CALC-MCNC: 2.8 G/DL (CALC) (ref 1.9–3.7)
GLUCOSE SERPL-MCNC: 78 MG/DL (ref 65–99)
HBA1C MFR BLD: 5.7 %
HCT VFR BLD AUTO: 46.6 % (ref 38.5–50)
HDLC SERPL-MCNC: 46 MG/DL
HGB BLD-MCNC: 15.2 G/DL (ref 13.2–17.1)
LDLC SERPL CALC-MCNC: 79 MG/DL (CALC)
LYMPHOCYTES # BLD AUTO: 1295 CELLS/UL (ref 850–3900)
LYMPHOCYTES NFR BLD AUTO: 18.5 %
MCH RBC QN AUTO: 28.9 PG (ref 27–33)
MCHC RBC AUTO-ENTMCNC: 32.6 G/DL (ref 32–36)
MCV RBC AUTO: 88.6 FL (ref 80–100)
MONOCYTES # BLD AUTO: 476 CELLS/UL (ref 200–950)
MONOCYTES NFR BLD AUTO: 6.8 %
NEUTROPHILS # BLD AUTO: 4900 CELLS/UL (ref 1500–7800)
NEUTROPHILS NFR BLD AUTO: 70 %
NONHDLC SERPL-MCNC: 97 MG/DL (CALC)
PLATELET # BLD AUTO: 332 THOUSAND/UL (ref 140–400)
PMV BLD REES-ECKER: 9.2 FL (ref 7.5–12.5)
POTASSIUM SERPL-SCNC: 4.9 MMOL/L (ref 3.5–5.3)
PROT SERPL-MCNC: 6.9 G/DL (ref 6.1–8.1)
PSA FREE MFR SERPL: NORMAL %
PSA FREE SERPL-MCNC: NORMAL NG/ML
PSA SERPL-MCNC: NORMAL NG/ML
RBC # BLD AUTO: 5.26 MILLION/UL (ref 4.2–5.8)
SODIUM SERPL-SCNC: 138 MMOL/L (ref 135–146)
TRIGL SERPL-MCNC: 101 MG/DL
TSH SERPL-ACNC: 3.26 MIU/L (ref 0.4–4.5)
URATE SERPL-MCNC: 7.3 MG/DL (ref 4–8)
WBC # BLD AUTO: 7 THOUSAND/UL (ref 3.8–10.8)

## 2025-06-30 ENCOUNTER — RESULTS FOLLOW-UP (OUTPATIENT)
Dept: FAMILY MEDICINE | Facility: CLINIC | Age: 65
End: 2025-06-30

## 2025-06-30 ENCOUNTER — PATIENT MESSAGE (OUTPATIENT)
Dept: FAMILY MEDICINE | Facility: CLINIC | Age: 65
End: 2025-06-30
Payer: COMMERCIAL

## 2025-06-30 DIAGNOSIS — K80.00 CALCULUS OF GALLBLADDER WITH ACUTE CHOLECYSTITIS WITHOUT OBSTRUCTION: Primary | ICD-10-CM

## 2025-07-16 ENCOUNTER — OFFICE VISIT (OUTPATIENT)
Dept: SURGERY | Facility: CLINIC | Age: 65
End: 2025-07-16
Payer: COMMERCIAL

## 2025-07-16 VITALS
WEIGHT: 270.06 LBS | BODY MASS INDEX: 36.58 KG/M2 | HEART RATE: 75 BPM | HEIGHT: 72 IN | SYSTOLIC BLOOD PRESSURE: 123 MMHG | DIASTOLIC BLOOD PRESSURE: 81 MMHG

## 2025-07-16 DIAGNOSIS — K80.20 SYMPTOMATIC CHOLELITHIASIS: Primary | ICD-10-CM

## 2025-07-16 DIAGNOSIS — K80.00 CALCULUS OF GALLBLADDER WITH ACUTE CHOLECYSTITIS WITHOUT OBSTRUCTION: ICD-10-CM

## 2025-07-16 PROCEDURE — 99204 OFFICE O/P NEW MOD 45 MIN: CPT | Mod: S$GLB,,, | Performed by: SURGERY

## 2025-07-16 PROCEDURE — 1159F MED LIST DOCD IN RCRD: CPT | Mod: CPTII,S$GLB,, | Performed by: SURGERY

## 2025-07-16 PROCEDURE — 3008F BODY MASS INDEX DOCD: CPT | Mod: CPTII,S$GLB,, | Performed by: SURGERY

## 2025-07-16 PROCEDURE — 1160F RVW MEDS BY RX/DR IN RCRD: CPT | Mod: CPTII,S$GLB,, | Performed by: SURGERY

## 2025-07-16 PROCEDURE — 3079F DIAST BP 80-89 MM HG: CPT | Mod: CPTII,S$GLB,, | Performed by: SURGERY

## 2025-07-16 PROCEDURE — 3044F HG A1C LEVEL LT 7.0%: CPT | Mod: CPTII,S$GLB,, | Performed by: SURGERY

## 2025-07-16 PROCEDURE — 99999 PR PBB SHADOW E&M-EST. PATIENT-LVL III: CPT | Mod: PBBFAC,,, | Performed by: SURGERY

## 2025-07-16 PROCEDURE — 3074F SYST BP LT 130 MM HG: CPT | Mod: CPTII,S$GLB,, | Performed by: SURGERY

## 2025-07-16 NOTE — PROGRESS NOTES
[facility] H&P    Subjective:     Mann Hernandez is a 64 y.o. male with PMH significant for prostate cancer s/p radioactive seed placement 11 yrs ago and BPH who presents to clinic for evaluation of intermittent abdominal discomfort and US confirmed gallstones. He has had 3 episodes of this discomfort in the past 2-3 months, each waking him from sleep, most recently mid-June. The first 2 episodes lasted 2-3hrs but the third lasted 4 days, with intense discomfort for the first day then gradually improving. He describes it as pressure/discomfort rather than pain, and tried tums and simethicone believing it may have been related to gas but neither improved symptoms. During the 4th episode, he states he felt feverish/flu-like and woke with night sweats, but when he took his temperature it was normal. He also had difficulty breathing normally. He does endorse poor diet in general, although he has done a lot in the past month to change this including cutting out beer, caffeine and high fat foods and has had no similar symptoms since. He saw his PCP the week after the third episode which is when he had his abdominal ultrasound which showed gallstones and gallbladder wall thickening. He does have GERD well-controlled on once daily prilosec, and has had difficulty urinating since the radiation treatment to his prostate, but states it is well controlled on flomax. Otherwise he feels well    PMH:   Past Medical History:   Diagnosis Date    Eczema 4/16/2019    Elevated PSA     Essential hypertension 4/16/2019    Family history of diabetes mellitus 4/16/2019    Family history of early CAD 10/17/2017    Gastroesophageal reflux disease 4/16/2019    History of colonic polyps 4/16/2019    History of prostate cancer 4/16/2019    Hypertension     Hyperuricemia 4/16/2019    Seasonal allergic rhinitis 4/16/2019       Past Surgical History:   Past Surgical History:   Procedure Laterality Date    CYST REMOVAL       PROSTATE BIOPSY         Social History:Social History[1]    Allergies:   Review of patient's allergies indicates:   Allergen Reactions    Clioquinol-hydrocortisone Swelling    Hydrochlorothiazide Nausea And Vomiting and Other (See Comments)     Foot edema and pain.       Medications:  Medications Ordered Prior to Encounter[2]      Objective:     Vital Signs (Most Recent)  Pulse: 75 (07/16/25 1033)  BP: 123/81 (07/16/25 1033)    ROS A 10+ review of systems was performed with pertinent positives and negatives noted above in the history of present illness.  Other systems were negative unless otherwise specified.    Physical Exam:  Gen: awake, alert, in no acute distress  HEENT: normocephalic, atraumatic, EOMI, no scleral icterus  CV: regular rate  Pulm: equal chest rise bilaterally, normal work of breathing  Abd:  soft, non-tender, no guarding  Ext: WWP, skin warm and dry    Imaging  The following imaging was reviewed:     Abdominal US  FINDINGS:  - Liver: Enlarged measuring 20.4 cm. Homogeneous echotexture. No focal hepatic lesions. HRI: 1.2, suggesting less than 5% steatosis.   - Gallbladder: Multiple mobile gallstones measuring up to 6 mm.  5 mm echogenic focus along the gallbladder wall with associated Doppler signal likely representing a polyp.  Gallbladder wall is thickened measuring 8 mm.  No pericholecystic fluid.  No wall hyperemia. No sonographic Gamboa's sign.   - Biliary system: The common duct is not dilated, measuring 5 mm.  No intrahepatic ductal dilatation.    Assessment:     Mann Hernandez is a 64 y.o. male with PMH significant for prostate cancer s/p radioactive seed placement 11 yrs ago and BPH who presents to clinic for evaluation of intermittent abdominal discomfort and US confirmed gallstones interested in cholecystectomy.    Plan:     - Plan for lap cholecystectomy  - Advised him to go to ED if he has severe symptoms or abdominal pain with fever for possible urgent surgery  - Will obtain  consent the am of surgery    Rama Vogt MD   General Surgery PGY-1    I have personally taken the history and examined this patient and agree with the resident's note as stated above.         Remington Slade MD             [1]   Social History  Socioeconomic History    Marital status:    Tobacco Use    Smoking status: Never    Smokeless tobacco: Never   Substance and Sexual Activity    Alcohol use: Yes     Comment: a few beers per day    Drug use: No     Social Drivers of Health     Financial Resource Strain: Low Risk  (6/25/2025)    Overall Financial Resource Strain (CARDIA)     Difficulty of Paying Living Expenses: Not hard at all   Food Insecurity: No Food Insecurity (6/25/2025)    Hunger Vital Sign     Worried About Running Out of Food in the Last Year: Never true     Ran Out of Food in the Last Year: Never true   Transportation Needs: No Transportation Needs (6/25/2025)    PRAPARE - Transportation     Lack of Transportation (Medical): No     Lack of Transportation (Non-Medical): No   Physical Activity: Sufficiently Active (6/25/2025)    Exercise Vital Sign     Days of Exercise per Week: 3 days     Minutes of Exercise per Session: 120 min   Stress: Stress Concern Present (6/25/2025)    [employer] [place] [employer] - Occupational Stress Questionnaire     Feeling of Stress : To some extent   Housing Stability: Low Risk  (6/25/2025)    Housing Stability Vital Sign     Unable to Pay for Housing in the Last Year: No     Number of Times Moved in the Last Year: 0     Homeless in the Last Year: No   [2]   Current Outpatient Medications on File Prior to Visit   Medication Sig Dispense Refill    buPROPion (WELLBUTRIN XL) 300 MG 24 hr tablet Take 300 mg by mouth every morning.      diazePAM (VALIUM) 5 MG tablet Take 1 tablet (5 mg total) by mouth every 8 (eight) hours as needed for Anxiety. 30 tablet 1    doxazosin (CARDURA) 4 MG tablet Take 4 mg by mouth.      fluticasone propionate (FLONASE) 50  mcg/actuation nasal spray 1 spray by Each Nare route once daily.      loratadine (CLARITIN) 10 mg tablet Take 10 mg by mouth daily as needed for Allergies.      omeprazole (PRILOSEC) 20 MG capsule Take 20 mg by mouth once daily.        spironolactone (ALDACTONE) 25 MG tablet Take 25 mg by mouth once daily.      tamsulosin (FLOMAX) 0.4 mg Cap Take 1 capsule by mouth.       No current facility-administered medications on file prior to visit.

## 2025-08-11 PROBLEM — N40.1 BENIGN PROSTATIC HYPERPLASIA WITH URINARY FREQUENCY: Status: RESOLVED | Noted: 2025-06-25 | Resolved: 2025-08-11

## 2025-08-11 PROBLEM — R35.0 BENIGN PROSTATIC HYPERPLASIA WITH URINARY FREQUENCY: Status: RESOLVED | Noted: 2025-06-25 | Resolved: 2025-08-11

## 2025-09-05 ENCOUNTER — PATIENT MESSAGE (OUTPATIENT)
Dept: FAMILY MEDICINE | Facility: CLINIC | Age: 65
End: 2025-09-05
Payer: COMMERCIAL

## 2025-09-05 DIAGNOSIS — F39 MOOD DISORDER: ICD-10-CM

## 2025-09-05 DIAGNOSIS — I10 ESSENTIAL HYPERTENSION: ICD-10-CM

## 2025-09-05 DIAGNOSIS — N40.1 BENIGN PROSTATIC HYPERPLASIA WITH URINARY FREQUENCY: Primary | ICD-10-CM

## 2025-09-05 DIAGNOSIS — R35.0 BENIGN PROSTATIC HYPERPLASIA WITH URINARY FREQUENCY: Primary | ICD-10-CM

## 2025-09-05 RX ORDER — DOXAZOSIN 4 MG/1
4 TABLET ORAL DAILY
Qty: 90 TABLET | Refills: 3 | Status: SHIPPED | OUTPATIENT
Start: 2025-09-05

## 2025-09-05 RX ORDER — SPIRONOLACTONE 25 MG/1
25 TABLET ORAL DAILY
Qty: 90 TABLET | Refills: 3 | Status: SHIPPED | OUTPATIENT
Start: 2025-09-05

## 2025-09-05 RX ORDER — TAMSULOSIN HYDROCHLORIDE 0.4 MG/1
1 CAPSULE ORAL DAILY
Qty: 90 CAPSULE | Refills: 3 | Status: SHIPPED | OUTPATIENT
Start: 2025-09-05

## 2025-09-05 RX ORDER — BUPROPION HYDROCHLORIDE 300 MG/1
300 TABLET ORAL EVERY MORNING
Qty: 90 TABLET | Refills: 3 | Status: SHIPPED | OUTPATIENT
Start: 2025-09-05